# Patient Record
Sex: FEMALE | Race: WHITE | HISPANIC OR LATINO
[De-identification: names, ages, dates, MRNs, and addresses within clinical notes are randomized per-mention and may not be internally consistent; named-entity substitution may affect disease eponyms.]

---

## 2023-12-27 PROBLEM — Z00.00 ENCOUNTER FOR PREVENTIVE HEALTH EXAMINATION: Status: ACTIVE | Noted: 2023-12-27

## 2024-01-02 ENCOUNTER — APPOINTMENT (OUTPATIENT)
Dept: OBGYN | Facility: CLINIC | Age: 24
End: 2024-01-02

## 2024-01-03 ENCOUNTER — OUTPATIENT (OUTPATIENT)
Dept: OUTPATIENT SERVICES | Facility: HOSPITAL | Age: 24
LOS: 1 days | End: 2024-01-03
Payer: SELF-PAY

## 2024-01-03 ENCOUNTER — APPOINTMENT (OUTPATIENT)
Dept: OBGYN | Facility: CLINIC | Age: 24
End: 2024-01-03
Payer: SELF-PAY

## 2024-01-03 VITALS — WEIGHT: 170 LBS | SYSTOLIC BLOOD PRESSURE: 124 MMHG | DIASTOLIC BLOOD PRESSURE: 80 MMHG

## 2024-01-03 DIAGNOSIS — Z30.46 ENCOUNTER FOR SURVEILLANCE OF IMPLANTABLE SUBDERMAL CONTRACEPTIVE: ICD-10-CM

## 2024-01-03 DIAGNOSIS — N76.0 ACUTE VAGINITIS: ICD-10-CM

## 2024-01-03 LAB — HCG UR QL: NEGATIVE

## 2024-01-03 PROCEDURE — 81025 URINE PREGNANCY TEST: CPT

## 2024-01-03 PROCEDURE — 11981 INSERTION DRUG DLVR IMPLANT: CPT | Mod: NC

## 2024-01-03 PROCEDURE — 11976 REMOVE CONTRACEPTIVE CAPSULE: CPT | Mod: NC

## 2024-01-03 PROCEDURE — G0463: CPT

## 2024-01-03 PROCEDURE — 99203 OFFICE O/P NEW LOW 30 MIN: CPT | Mod: 25

## 2024-01-03 PROCEDURE — 11981 INSERTION DRUG DLVR IMPLANT: CPT

## 2024-01-03 PROCEDURE — T1013: CPT

## 2024-01-03 PROCEDURE — 11976 REMOVE CONTRACEPTIVE CAPSULE: CPT

## 2024-01-03 NOTE — HISTORY OF PRESENT ILLNESS
[FreeTextEntry1] : 22yo G0 (LMP 12/31/23) presents as new pt to have contraceptive implant removed and replaced.  Pt had 2 rods inserted in [place] 5yrs ago.  Went to outside gyn to have removed/replaced 2 weeks ago- practitioner struggled getting first curtis out. Pt states she experienced tingling pain in her fingers.  Pt left office without second curtis removed or first incision being closed.  Went to ed 2/2 bleeding and hole in arm.   ED advised pt to follow up here for removal of second curtis.   Pt desires another Nexplanon.  Denies having sexual relations >1 month.    Pt has never had gyn exam- declined today 2/2 menses  Gynhx: denies stds, never had pap.   Gardasil- unsure Pmhx: denies Shx: denies Meds: none ALL: NKDA Sochx: denies tob/etoh/drugs.  Denies dv or abuse issues Famhx: denies any cancers

## 2024-01-03 NOTE — DISCUSSION/SUMMARY
[FreeTextEntry1] : 24yo G0- new pt here for foreign contraceptive implant removal (after failed attempt at second curtis at outside facility) and Nexplanon re-insertion - pt tolerated procedure well.  - Nexplanon re-inserted in appropriate location (over triceps) - bandage instructions reviewed  RTC 2 wks for full GYN exam/ pap (pt deferred today) [facility]

## 2024-01-03 NOTE — REASON FOR VISIT
[Initial] : an initial consultation for [[employer] ] : provided by [employer]   [Time Spent: ____ minutes] : Total time spent using  services: [unfilled] minutes. The patient's primary language is not English thus required  services. [Interpreters_IDNumber] : [id number] [TWNoteComboBox1] : [place]

## 2024-01-04 DIAGNOSIS — Z30.46 ENCOUNTER FOR SURVEILLANCE OF IMPLANTABLE SUBDERMAL CONTRACEPTIVE: ICD-10-CM

## 2024-01-04 DIAGNOSIS — Z00.00 ENCOUNTER FOR GENERAL ADULT MEDICAL EXAMINATION WITHOUT ABNORMAL FINDINGS: ICD-10-CM
